# Patient Record
Sex: MALE | Race: WHITE
[De-identification: names, ages, dates, MRNs, and addresses within clinical notes are randomized per-mention and may not be internally consistent; named-entity substitution may affect disease eponyms.]

---

## 2017-05-20 ENCOUNTER — HOSPITAL ENCOUNTER (EMERGENCY)
Dept: HOSPITAL 58 - ED | Age: 37
Discharge: HOME | End: 2017-05-20

## 2017-05-20 VITALS — TEMPERATURE: 97.8 F | SYSTOLIC BLOOD PRESSURE: 138 MMHG | DIASTOLIC BLOOD PRESSURE: 78 MMHG

## 2017-05-20 VITALS — BODY MASS INDEX: 21.7 KG/M2

## 2017-05-20 DIAGNOSIS — T63.331A: Primary | ICD-10-CM

## 2017-05-20 DIAGNOSIS — F17.210: ICD-10-CM

## 2017-05-20 PROCEDURE — 99283 EMERGENCY DEPT VISIT LOW MDM: CPT

## 2017-05-20 PROCEDURE — 96372 THER/PROPH/DIAG INJ SC/IM: CPT

## 2017-05-20 PROCEDURE — 87070 CULTURE OTHR SPECIMN AEROBIC: CPT

## 2017-05-20 PROCEDURE — 87186 SC STD MICRODIL/AGAR DIL: CPT

## 2017-05-20 NOTE — ED.PDOC
General


ED Provider: 


Dr. PARIS DE LOS SANTOS





Chief Complaint: Bite


Stated Complaint: Think he may have been bit two days ago by a spider on the 

right chest.


Time Seen by Physician: 11:50


Information Source: Patient


Exam Limitations: No limitations


Seen Within Last 72 Hours for Same Complaint By: ED


Nursing and Triage Documentation Reviewed and Agree: Yes





Skin Complaint Exam





- Skin/Soft Tissue Complaint/Exam


Onset/Duration: 3 days 


Symptoms Are: Still present


Timing: Constant


Initial Severity: Mild


Current Severity: Moderate


Location: Medial aspect of the right breast 


Character: Reports: Redness, Swelling, Raised, Painful


Aggravating: Reports: Touch


Alleviating: Reports: None


Associated Signs and Symptoms: Reports: Tenderness, Red streaks


Related History: Reports: Insect bite/sting, Prior MRSA/VRE


Related Surgical History: Reports: None


Recent Exposure to Others w/Similar Symptoms: No


Skin Findings: Present: Erythema, Induration, Fluctuant mass


Joint Tenderness Present: No


Differential Diagnoses: Abscess, Cellulitis, Infection, Lymphadenitis, MRSA





Review of Systems





- Review Of Systems


Constitutional: Reports: No symptoms


Musculoskeletal: Reports: Muscle pain


Skin: Reports: Rash


Neurological: Reports: Anxiety


All Other Systems: Reviewed and Negative





Past Medical History





- Past Medical History


Endocrine: Reports: None


Cardiovascular: Reports: None


Respiratory: Reports: None


Hematological: Reports: None


Gastrointestinal: Reports: None


Genitourinary: Reports: None


Neuro/Psych: Reports: None


Musculoskeletal: Reports: None


Cancer: Reports: None


Other Pertinent Past Medical History: left thumb injury, rt hand abscess





- Surgical History


General Surgical History: Reports: None





- Family History


Family History: Reports: None





- Social History


Smoking Status: Current every day smoker, Light tobacco smoker


Hx Substance Use: No


Alcohol Screening: Occasionally





- Immunizations


Tetanus Shot up to Date: No





Physical Exam





- Physical Exam


Appearance: Ill-appearing


Ill-appearing: Mild


Pain Distress: Moderate


Eyes: LILIANA


Neck: Supple


Respiratory: Airway patent, Breath sounds clear, Breath sounds equal, 

Respirations nonlabored


Cardiovascular: RRR, Pulses normal, No rub, No murmur


Skin: Warm, Dry


Psychiatric: Anxious





Procedures





- Incision and Drainage


Site: right breast just medial to the nipple 


Instrument Used: 11 Blade


I & D Procedure: Yes: Betadine Prep, Sterile dressing applied


Lidocaine Used: Yes


Type of Drainage: Present: Pus, Blood


Irrigated: Yes


Progress: 


Tolerated procedure well 





Critical Care Note





- Critical Care Note


Total Time (mins): 0





Course





- Course


Orders, Labs, Meds: 


Orders











 Category Date Time Status


 


 WOUND CULTURE Stat LAB  05/20/17 12:40 Results


 


 Ceftriaxone Sodium [Rocephin] MEDS  05/20/17 11:44 Discontinued





 1 gm IM ONCE STA   


 


 Ketorolac Tromethamine [Toradol] MEDS  05/20/17 11:47 Discontinued





 60 mg IM ONCE STA   


 


 Lidocaine HCl/Pf [Lidocaine 1 % Amp 5 ml (Sutures)] MEDS  05/20/17 11:44 

Discontinued





 2.1 ml IM ONCE STA   


 


 Lidocaine HCl/Pf [Lidocaine 1 % Amp 5 ml (Sutures)] MEDS  05/20/17 11:43 

Discontinued





 5 ml SQ ONCE STA   


 


 Lidocaine HCl/Pf [Lidocaine HCl 4% 5 ml Amp] MEDS  05/20/17 12:43 Discontinued





 1 ml .ROUTE .STK-MED ONE   


 


 Sulfamethoxazole/Trimethoprim [Bactrim Ds 800/160 mg] MEDS  05/20/17 11:44 

Discontinued





 1 tab PO ONCE STA   








Medications














Discontinued Medications














Generic Name Dose Route Start Last Admin





  Trade Name Lyndsey ALMANZAN Reason Stop Dose Admin


 


Ceftriaxone Sodium  1 gm  05/20/17 11:44  05/20/17 12:14





  Rocephin  IM  05/20/17 11:45  1 gm





  ONCE STA   Administration


 


Ketorolac Tromethamine  60 mg  05/20/17 11:47  05/20/17 12:14





  Toradol  IM  05/20/17 11:48  60 mg





  ONCE STA   Administration


 


Lidocaine HCl  5 ml  05/20/17 11:43  05/20/17 12:16





  Lidocaine 1 % Amp 5 Ml (Sutures)  SQ  05/20/17 11:44  5 ml





  ONCE STA   Administration


 


Lidocaine HCl  2.1 ml  05/20/17 11:44  05/20/17 12:11





  Lidocaine 1 % Amp 5 Ml (Sutures)  IM  05/20/17 11:45  2.1 ml





  ONCE STA   Administration


 


Trimethoprim/Sulfamethoxazole  1 tab  05/20/17 11:44  05/20/17 12:10





  Bactrim Ds 800/160 Mg  PO  05/20/17 11:45  1 tab





  ONCE STA   Administration











Vital Signs: 


 











  Temp Pulse Resp BP Pulse Ox


 


 05/20/17 11:15  97.8 F  103 H  18  138/78  98














Departure





- Departure


Time of Disposition: 12:59


Disposition: HOME SELF-CARE


Discharge Problem: 


 Spider bite wound





Instructions:  Brown Recluse Spider Bite (ED)


Condition: Fair


Pt referred to PMD for follow-up: Yes


Additional Instructions: 


Take medications as prescribed 


Follow up with PCP or ER in 2 days for wound check 





Prescriptions: 


Hydrocodone/Acetaminophen [Norco 5-325 Tablet] 1 tab PO Q6HR PRN #20 tablet


 PRN Reason: PAIN


Cephalexin [Keflex] 500 mg PO Q8HR #30 capsule


Ibuprofen 800 mg PO TID #30 tablet


Sulfamethoxazole/Trimethoprim [Bactrim Ds Tablet] 1 each PO BID #20 tablet


Allergies/Adverse Reactions: 


Allergies





No Known Allergies Allergy (Unverified 05/20/17 11:23)


 








Home Medications: 


Ambulatory Orders





Cephalexin [Keflex] 500 mg PO Q8HR #30 capsule 05/20/17 


Hydrocodone/Acetaminophen [Norco 5-325 Tablet] 1 tab PO Q6HR PRN #20 tablet 05/ 20/17 


Ibuprofen 800 mg PO TID #30 tablet 05/20/17 


Sulfamethoxazole/Trimethoprim [Bactrim Ds Tablet] 1 each PO BID #20 tablet 05/20 /17 








Transfer Form Completed: No


Disposition Discussed With: Patient

## 2017-05-22 ENCOUNTER — HOSPITAL ENCOUNTER (EMERGENCY)
Facility: HOSPITAL | Age: 37
Discharge: HOME | End: 2017-05-22
Admitting: NURSE PRACTITIONER

## 2017-05-22 VITALS
SYSTOLIC BLOOD PRESSURE: 142 MMHG | RESPIRATION RATE: 15 BRPM | BODY MASS INDEX: 22.35 KG/M2 | WEIGHT: 165 LBS | TEMPERATURE: 98.3 F | OXYGEN SATURATION: 100 % | HEART RATE: 84 BPM | DIASTOLIC BLOOD PRESSURE: 104 MMHG | HEIGHT: 72 IN

## 2017-05-22 DIAGNOSIS — N61.1 ABSCESS OF BREAST, RIGHT: Primary | ICD-10-CM

## 2017-05-22 DIAGNOSIS — T63.301A SPIDER BITE, ACCIDENTAL OR UNINTENTIONAL, INITIAL ENCOUNTER: ICD-10-CM

## 2017-05-22 LAB
ALBUMIN SERPL-MCNC: 4.1 G/DL (ref 3.5–5)
ALBUMIN/GLOB SERPL: 1.3 G/DL (ref 1.1–2.5)
ALP SERPL-CCNC: 96 U/L (ref 24–120)
ALT SERPL W P-5'-P-CCNC: 26 U/L (ref 0–54)
ANION GAP SERPL CALCULATED.3IONS-SCNC: 13 MMOL/L (ref 4–13)
AST SERPL-CCNC: 46 U/L (ref 7–45)
BASOPHILS # BLD AUTO: 0.03 10*3/MM3 (ref 0–0.2)
BASOPHILS NFR BLD AUTO: 0.3 % (ref 0–2)
BILIRUB SERPL-MCNC: 0.4 MG/DL (ref 0.1–1)
BUN BLD-MCNC: 5 MG/DL (ref 5–21)
BUN/CREAT SERPL: 6.3 (ref 7–25)
CALCIUM SPEC-SCNC: 9.4 MG/DL (ref 8.4–10.4)
CHLORIDE SERPL-SCNC: 102 MMOL/L (ref 98–110)
CO2 SERPL-SCNC: 27 MMOL/L (ref 24–31)
CREAT BLD-MCNC: 0.8 MG/DL (ref 0.5–1.4)
DEPRECATED RDW RBC AUTO: 39.8 FL (ref 40–54)
EOSINOPHIL # BLD AUTO: 0.18 10*3/MM3 (ref 0–0.7)
EOSINOPHIL NFR BLD AUTO: 2 % (ref 0–4)
ERYTHROCYTE [DISTWIDTH] IN BLOOD BY AUTOMATED COUNT: 12.3 % (ref 12–15)
GFR SERPL CREATININE-BSD FRML MDRD: 109 ML/MIN/1.73
GLOBULIN UR ELPH-MCNC: 3.2 GM/DL
GLUCOSE BLD-MCNC: 71 MG/DL (ref 70–100)
HCT VFR BLD AUTO: 41.3 % (ref 40–52)
HGB BLD-MCNC: 14.8 G/DL (ref 14–18)
IMM GRANULOCYTES # BLD: 0.03 10*3/MM3 (ref 0–0.03)
IMM GRANULOCYTES NFR BLD: 0.3 % (ref 0–5)
LYMPHOCYTES # BLD AUTO: 1.79 10*3/MM3 (ref 0.72–4.86)
LYMPHOCYTES NFR BLD AUTO: 19.5 % (ref 15–45)
MCH RBC QN AUTO: 31.4 PG (ref 28–32)
MCHC RBC AUTO-ENTMCNC: 35.8 G/DL (ref 33–36)
MCV RBC AUTO: 87.7 FL (ref 82–95)
MONOCYTES # BLD AUTO: 0.59 10*3/MM3 (ref 0.19–1.3)
MONOCYTES NFR BLD AUTO: 6.4 % (ref 4–12)
NEUTROPHILS # BLD AUTO: 6.54 10*3/MM3 (ref 1.87–8.4)
NEUTROPHILS NFR BLD AUTO: 71.5 % (ref 39–78)
PLATELET # BLD AUTO: 275 10*3/MM3 (ref 130–400)
PMV BLD AUTO: 9.8 FL (ref 6–12)
POTASSIUM BLD-SCNC: 3.1 MMOL/L (ref 3.5–5.3)
PROT SERPL-MCNC: 7.3 G/DL (ref 6.3–8.7)
RBC # BLD AUTO: 4.71 10*6/MM3 (ref 4.8–5.9)
SODIUM BLD-SCNC: 142 MMOL/L (ref 135–145)
WBC NRBC COR # BLD: 9.16 10*3/MM3 (ref 4.8–10.8)

## 2017-05-22 PROCEDURE — 80053 COMPREHEN METABOLIC PANEL: CPT | Performed by: NURSE PRACTITIONER

## 2017-05-22 PROCEDURE — 25010000002 HYDROMORPHONE PER 4 MG: Performed by: NURSE PRACTITIONER

## 2017-05-22 PROCEDURE — 96375 TX/PRO/DX INJ NEW DRUG ADDON: CPT

## 2017-05-22 PROCEDURE — 85025 COMPLETE CBC W/AUTO DIFF WBC: CPT | Performed by: NURSE PRACTITIONER

## 2017-05-22 PROCEDURE — 25010000002 ONDANSETRON PER 1 MG: Performed by: NURSE PRACTITIONER

## 2017-05-22 PROCEDURE — 99283 EMERGENCY DEPT VISIT LOW MDM: CPT

## 2017-05-22 PROCEDURE — 87147 CULTURE TYPE IMMUNOLOGIC: CPT | Performed by: NURSE PRACTITIONER

## 2017-05-22 PROCEDURE — 87185 SC STD ENZYME DETCJ PER NZM: CPT | Performed by: NURSE PRACTITIONER

## 2017-05-22 PROCEDURE — 87186 SC STD MICRODIL/AGAR DIL: CPT | Performed by: NURSE PRACTITIONER

## 2017-05-22 PROCEDURE — 87070 CULTURE OTHR SPECIMN AEROBIC: CPT | Performed by: NURSE PRACTITIONER

## 2017-05-22 PROCEDURE — 87205 SMEAR GRAM STAIN: CPT | Performed by: NURSE PRACTITIONER

## 2017-05-22 PROCEDURE — 87040 BLOOD CULTURE FOR BACTERIA: CPT | Performed by: NURSE PRACTITIONER

## 2017-05-22 PROCEDURE — 96365 THER/PROPH/DIAG IV INF INIT: CPT

## 2017-05-22 RX ORDER — CLINDAMYCIN HYDROCHLORIDE 300 MG/1
300 CAPSULE ORAL EVERY 6 HOURS
Qty: 40 CAPSULE | Refills: 0 | Status: SHIPPED | OUTPATIENT
Start: 2017-05-22 | End: 2017-06-01

## 2017-05-22 RX ORDER — HYDROCODONE BITARTRATE AND ACETAMINOPHEN 7.5; 325 MG/1; MG/1
1 TABLET ORAL EVERY 4 HOURS PRN
Qty: 15 TABLET | Refills: 0 | Status: SHIPPED | OUTPATIENT
Start: 2017-05-22

## 2017-05-22 RX ORDER — CLINDAMYCIN PHOSPHATE 900 MG/50ML
900 INJECTION INTRAVENOUS ONCE
Status: COMPLETED | OUTPATIENT
Start: 2017-05-22 | End: 2017-05-22

## 2017-05-22 RX ORDER — ONDANSETRON 2 MG/ML
4 INJECTION INTRAMUSCULAR; INTRAVENOUS ONCE
Status: COMPLETED | OUTPATIENT
Start: 2017-05-22 | End: 2017-05-22

## 2017-05-22 RX ADMIN — HYDROMORPHONE HYDROCHLORIDE 1 MG: 1 INJECTION, SOLUTION INTRAMUSCULAR; INTRAVENOUS; SUBCUTANEOUS at 18:45

## 2017-05-22 RX ADMIN — CLINDAMYCIN PHOSPHATE 900 MG: 18 INJECTION, SOLUTION INTRAVENOUS at 19:08

## 2017-05-22 RX ADMIN — ONDANSETRON 4 MG: 2 INJECTION INTRAMUSCULAR; INTRAVENOUS at 18:48

## 2017-05-25 LAB
B-LACTAMASE USUAL SUSC ISLT: POSITIVE
BACTERIA SPEC AEROBE CULT: ABNORMAL
GRAM STN SPEC: ABNORMAL
GRAM STN SPEC: ABNORMAL

## 2017-05-27 LAB
BACTERIA SPEC AEROBE CULT: NORMAL
BACTERIA SPEC AEROBE CULT: NORMAL

## 2017-09-12 ENCOUNTER — HOSPITAL ENCOUNTER (OUTPATIENT)
Dept: PHYSICAL THERAPY | Facility: HOSPITAL | Age: 37
Discharge: HOME | End: 2017-09-12

## 2017-09-12 PROCEDURE — 97799 UNLISTED PHYSCL MED/REHAB PX: CPT

## 2017-11-01 ENCOUNTER — HOSPITAL ENCOUNTER (EMERGENCY)
Age: 37
Discharge: HOME | End: 2017-11-01
Attending: EMERGENCY MEDICINE
Payer: MEDICAID

## 2017-11-01 VITALS
OXYGEN SATURATION: 99 % | RESPIRATION RATE: 20 BRPM | WEIGHT: 160 LBS | HEART RATE: 84 BPM | BODY MASS INDEX: 21.67 KG/M2 | SYSTOLIC BLOOD PRESSURE: 132 MMHG | HEIGHT: 72 IN | TEMPERATURE: 97.7 F | DIASTOLIC BLOOD PRESSURE: 85 MMHG

## 2017-11-01 DIAGNOSIS — H92.03 OTALGIA OF BOTH EARS: ICD-10-CM

## 2017-11-01 DIAGNOSIS — J02.9 ACUTE PHARYNGITIS, UNSPECIFIED ETIOLOGY: Primary | ICD-10-CM

## 2017-11-01 DIAGNOSIS — H69.83 DYSFUNCTION OF BOTH EUSTACHIAN TUBES: ICD-10-CM

## 2017-11-01 PROCEDURE — 99282 EMERGENCY DEPT VISIT SF MDM: CPT

## 2017-11-01 PROCEDURE — 99282 EMERGENCY DEPT VISIT SF MDM: CPT | Performed by: EMERGENCY MEDICINE

## 2017-11-01 RX ORDER — HYDROCODONE BITARTRATE AND ACETAMINOPHEN 7.5; 325 MG/1; MG/1
1 TABLET ORAL EVERY 6 HOURS PRN
Qty: 10 TABLET | Refills: 0 | Status: SHIPPED | OUTPATIENT
Start: 2017-11-01 | End: 2017-11-04

## 2017-11-01 RX ORDER — AMOXICILLIN AND CLAVULANATE POTASSIUM 875; 125 MG/1; MG/1
1 TABLET, FILM COATED ORAL 2 TIMES DAILY
Qty: 20 TABLET | Refills: 0 | Status: SHIPPED | OUTPATIENT
Start: 2017-11-01 | End: 2017-11-11

## 2017-11-01 ASSESSMENT — PAIN SCALES - GENERAL: PAINLEVEL_OUTOF10: 8

## 2017-11-01 ASSESSMENT — ENCOUNTER SYMPTOMS
EYE DISCHARGE: 0
APNEA: 0
RHINORRHEA: 0
SORE THROAT: 1
VOICE CHANGE: 0
COUGH: 1
CONSTIPATION: 0
CHOKING: 0
SHORTNESS OF BREATH: 0
NAUSEA: 0
BLOOD IN STOOL: 0
DIARRHEA: 0
FACIAL SWELLING: 1
TROUBLE SWALLOWING: 0
SINUS PRESSURE: 0

## 2017-11-01 ASSESSMENT — PAIN DESCRIPTION - LOCATION: LOCATION: EAR;THROAT

## 2017-11-01 ASSESSMENT — PAIN DESCRIPTION - ORIENTATION: ORIENTATION: LEFT

## 2017-11-01 NOTE — ED PROVIDER NOTES
[facility]  eMERGENCY dEPARTMENT eNCOUnter      Pt Name: Marisabel Aleman  MRN: [id number]  [place] 1980  Date of evaluation: 11/1/2017  Provider: Debra Lala MD    [address] [place] [address]       Chief Complaint   Patient presents with   Maru Patches     left ear stared last night    Pharyngitis     started last night         HISTORY OF PRESENT ILLNESS   (Location/Symptom, Timing/Onset, Context/Setting, Quality, Duration, Modifying Factors, Severity)  Note limiting factors. Marisabel Aleman is a 40 y.o. male who presents to the emergency department Sore throat and earache. 26-year-old male complains of severe pharyngitis jaw pain and bilateral ear pain. He works on [employer]. He's been home about 3 weeks. He is on call and expecting it called out at any moment. That's why he came in this morning to get medical treatment. His mother had to Augmentin 875 mg tablets left he's taken nose. He thinks his lymph nodes early starting to go down. But he doesn't have any more antibiotics otherwise he would come in he states. He is complaining of quite a bit of pain. His neck is supple. He doesn't have chest pain which had some cough. He does smoke. He doesn't complain of any swollen nodes elsewhere. The history is provided by the patient. Nursing Notes were reviewed. REVIEW OF SYSTEMS    (2-9 systems for level 4, 10 or more for level 5)     Review of Systems   Constitutional: Negative for chills and fever. HENT: Positive for ear pain, facial swelling (bilateral jaw) and sore throat. Negative for dental problem, drooling, mouth sores, nosebleeds, rhinorrhea, sinus pressure, trouble swallowing and voice change. Eyes: Negative for discharge. Respiratory: Positive for cough (occasional). Negative for apnea, choking and shortness of breath. Cardiovascular: Negative for chest pain and leg swelling. Gastrointestinal: Negative for blood in stool, constipation, diarrhea and nausea. Genitourinary: Negative for enuresis. Musculoskeletal: Negative for joint swelling. Skin: Negative for rash and wound. Neurological: Negative for seizures and syncope. Hematological: Positive for adenopathy (just in the neck). Psychiatric/Behavioral: Negative for behavioral problems, hallucinations and suicidal ideas. All other systems reviewed and are negative. A complete review of systems was performed and is negative except as noted above in the HPI. PAST MEDICAL HISTORY   History reviewed. No pertinent past medical history. SURGICAL HISTORY       Past Surgical History:   Procedure Laterality Date    HAND SURGERY Bilateral          CURRENT MEDICATIONS       Previous Medications    No medications on file       ALLERGIES     Review of patient's allergies indicates no known allergies. FAMILY HISTORY     History reviewed. No pertinent family history. SOCIAL HISTORY       Social History     Social History    Marital status: Single     Spouse name: N/A    Number of children: N/A    Years of education: N/A     Social History Main Topics    Smoking status: Current Every Day Smoker     Types: Cigarettes    Smokeless tobacco: Never Used    Alcohol use Yes    Drug use:      Types: Marijuana    Sexual activity: Not Asked     Other Topics Concern    None     Social History Narrative    None       SCREENINGS    [place] Coma Scale  Eye Opening: Spontaneous  Best Verbal Response: Oriented  Best Motor Response: Obeys commands  [place] Coma Scale Score: 15        PHYSICAL EXAM    (up to 7 for level 4, 8 or more for level 5)     ED Triage Vitals [11/01/17 0549]   BP Temp Temp src Pulse Resp SpO2 Height Weight   132/85 97.7 °F (36.5 °C) -- 84 20 99 % 6' (1.829 m) 160 lb (72.6 kg)       Physical Exam   Constitutional: He is oriented to person, place, and time. He appears well-developed and well-nourished. No distress. HENT:   Head: Normocephalic and atraumatic.    Right Ear: External ear normal.   Left Ear: External ear normal.   Nose: Nose normal.   Mouth/Throat: Oropharynx is clear and moist.   Seems a little puffy over the angles of the mandible. I will see any erythema of the TMs or fluid. Seems to be tender over palpation of the neck along eustachian tubes   Eyes: Conjunctivae and EOM are normal. Pupils are equal, round, and reactive to light. No scleral icterus. Neck: Normal range of motion. Neck supple. Cardiovascular: Normal rate, regular rhythm, normal heart sounds and intact distal pulses. No murmur heard. Pulmonary/Chest: Effort normal and breath sounds normal. No respiratory distress. Abdominal: Soft. Bowel sounds are normal.   Musculoskeletal: Normal range of motion. Lymphadenopathy:     He has cervical adenopathy. Neurological: He is alert and oriented to person, place, and time. No cranial nerve deficit. Skin: Skin is warm and dry. He is not diaphoretic. Psychiatric: His speech is normal. His mood appears anxious. Nursing note and vitals reviewed. DIAGNOSTIC RESULTS     EKG: All EKG's are interpreted by the Emergency Department Physician who either signs or Co-signs this chart in the absence of a cardiologist.        RADIOLOGY:   Non-plain film images such as CT, Ultrasound and MRI are read by the radiologist. Plain radiographic images are visualized and preliminarily interpreted by the emergency physician with the below findings:        Interpretation per the Radiologist below, if available at the time of this note:    No orders to display         ED BEDSIDE ULTRASOUND:   Performed by ED Physician - none    LABS:  Labs Reviewed - No data to display    All other labs were within normal range or not returned as of this dictation.     EMERGENCY DEPARTMENT COURSE and DIFFERENTIAL DIAGNOSIS/MDM:   Vitals:    Vitals:    11/01/17 0549   BP: 132/85   Pulse: 84   Resp: 20   Temp: 97.7 °F (36.5 °C)   SpO2: 99%   Weight: 160 lb (72.6 kg)   Height: 6' (1.829 m)

## 2018-03-23 ENCOUNTER — APPOINTMENT (OUTPATIENT)
Dept: GENERAL RADIOLOGY | Age: 38
End: 2018-03-23
Payer: COMMERCIAL

## 2018-03-23 ENCOUNTER — HOSPITAL ENCOUNTER (EMERGENCY)
Age: 38
Discharge: HOME | End: 2018-03-23
Attending: EMERGENCY MEDICINE
Payer: COMMERCIAL

## 2018-03-23 ENCOUNTER — HOSPITAL ENCOUNTER (OUTPATIENT)
Dept: HOSPITAL 58 - AMBL | Age: 38
Discharge: ANOTHER HOSPITAL | End: 2018-03-23
Attending: INTERNAL MEDICINE

## 2018-03-23 ENCOUNTER — APPOINTMENT (OUTPATIENT)
Dept: CT IMAGING | Age: 38
End: 2018-03-23
Payer: COMMERCIAL

## 2018-03-23 ENCOUNTER — HOSPITAL ENCOUNTER (OUTPATIENT)
Dept: HOSPITAL 58 - AMBL | Age: 38
Discharge: ANOTHER HOSPITAL | End: 2018-03-23
Attending: EMERGENCY MEDICINE

## 2018-03-23 ENCOUNTER — HOSPITAL ENCOUNTER (EMERGENCY)
Dept: HOSPITAL 58 - ED | Age: 38
Discharge: HOME | End: 2018-03-23

## 2018-03-23 VITALS
HEIGHT: 72 IN | OXYGEN SATURATION: 95 % | SYSTOLIC BLOOD PRESSURE: 132 MMHG | RESPIRATION RATE: 18 BRPM | HEART RATE: 74 BPM | WEIGHT: 151 LBS | DIASTOLIC BLOOD PRESSURE: 84 MMHG | TEMPERATURE: 97.7 F | BODY MASS INDEX: 20.45 KG/M2

## 2018-03-23 VITALS — BODY MASS INDEX: 20.6 KG/M2

## 2018-03-23 VITALS — SYSTOLIC BLOOD PRESSURE: 128 MMHG | DIASTOLIC BLOOD PRESSURE: 81 MMHG | TEMPERATURE: 97.5 F

## 2018-03-23 DIAGNOSIS — S02.2XXA: ICD-10-CM
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
## 2018-03-23 DIAGNOSIS — M79.641: ICD-10-CM
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
## 2018-03-23 DIAGNOSIS — S09.90XA CLOSED HEAD INJURY, INITIAL ENCOUNTER: Primary | ICD-10-CM

## 2018-03-23 DIAGNOSIS — S01.21XA: ICD-10-CM

## 2018-03-23 DIAGNOSIS — R41.82: ICD-10-CM
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
## 2018-03-23 DIAGNOSIS — S01.01XA: Primary | ICD-10-CM
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
## 2018-03-23 DIAGNOSIS — S02.2XXA CLOSED FRACTURE OF NASAL BONE, INITIAL ENCOUNTER: ICD-10-CM

## 2018-03-23 DIAGNOSIS — R40.4: Primary | ICD-10-CM

## 2018-03-23 DIAGNOSIS — S61.212A: ICD-10-CM

## 2018-03-23 DIAGNOSIS — R52: ICD-10-CM

## 2018-03-23 DIAGNOSIS — R47.81: ICD-10-CM

## 2018-03-23 DIAGNOSIS — F17.210: ICD-10-CM

## 2018-03-23 DIAGNOSIS — R51: ICD-10-CM

## 2018-03-23 DIAGNOSIS — S01.81XA: ICD-10-CM

## 2018-03-23 DIAGNOSIS — S00.83XA CONTUSION OF FACE, INITIAL ENCOUNTER: ICD-10-CM

## 2018-03-23 PROCEDURE — 99283 EMERGENCY DEPT VISIT LOW MDM: CPT

## 2018-03-23 PROCEDURE — 70450 CT HEAD/BRAIN W/O DYE: CPT

## 2018-03-23 PROCEDURE — 72050 X-RAY EXAM NECK SPINE 4/5VWS: CPT

## 2018-03-23 PROCEDURE — 99284 EMERGENCY DEPT VISIT MOD MDM: CPT | Performed by: EMERGENCY MEDICINE

## 2018-03-23 PROCEDURE — 80307 DRUG TEST PRSMV CHEM ANLYZR: CPT

## 2018-03-23 PROCEDURE — 96372 THER/PROPH/DIAG INJ SC/IM: CPT

## 2018-03-23 PROCEDURE — 70486 CT MAXILLOFACIAL W/O DYE: CPT

## 2018-03-23 PROCEDURE — 36415 COLL VENOUS BLD VENIPUNCTURE: CPT

## 2018-03-23 ASSESSMENT — ENCOUNTER SYMPTOMS
BLURRED VISION: 0
VOMITING: 0
DOUBLE VISION: 0
NAUSEA: 0

## 2018-03-23 NOTE — CT
EXAM:  CT head without contrast 03/23/2018.  Sagittal and coronal reformatted images obtained 

  

HISTORY:  Head injury 

  

COMPARISON:  03/23/2018 

  

FINDINGS: There is no evidence of intracranial hemorrhage.  The midline is maintained.  There is no h
ydrocephalus.    No cerebellar tonsillar ectopia.  Evaluation of the calvarium shows no fracture.  Th
e mastoid air cells are normally pneumatized. 

  

IMPRESSION: No acute intracranial abnormality.

## 2018-03-23 NOTE — ED NOTES
Bed: 11  Expected date:   Expected time:   Means of arrival:   Comments:     John Reddy  03/23/18 [id number]
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
## 2018-03-23 NOTE — CT
EXAM:  CT maxillofacial without intravenous contrast 03/23/2018.  Sagittal and coronal reformatted im
ages obtained 

  

HISTORY:  Facial injury 

  

COMPARISON:  03/23/2018 

  

FINDINGS:  The orbits, zygoma, maxilla and mandible appear intact without fracture. 

  

There are multiple acute right and left sided nasal bone fractures.  Comminuted fractures of the nasa
l bones show minimal displacement towards the right.  Displacement measures up to approximately one -
 2 mm. 

  

Mucosal thickening of the ethmoidal, sphenoidal and maxillary sinuses most compatible with sinusitis.
 

  

Left periorbital soft tissue swelling.  Swelling along the left aspect of the nose. 

  

IMPRESSION: 

1.  Superficial soft tissue swelling. 

2.  Multiple mildly displaced nasal bone fractures. 

3.  Ethmoidal, sphenoidal and maxillary sinusitis.
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
